# Patient Record
Sex: FEMALE | Race: WHITE | NOT HISPANIC OR LATINO | URBAN - METROPOLITAN AREA
[De-identification: names, ages, dates, MRNs, and addresses within clinical notes are randomized per-mention and may not be internally consistent; named-entity substitution may affect disease eponyms.]

---

## 2019-12-23 ENCOUNTER — OFFICE VISIT - HEALTHEAST (OUTPATIENT)
Dept: FAMILY MEDICINE | Facility: CLINIC | Age: 24
End: 2019-12-23

## 2019-12-23 DIAGNOSIS — J06.9 VIRAL URI WITH COUGH: ICD-10-CM

## 2019-12-23 RX ORDER — LORAZEPAM 0.5 MG/1
0.5 TABLET ORAL
Status: SHIPPED | COMMUNITY
Start: 2016-08-29

## 2021-06-04 VITALS
HEART RATE: 85 BPM | OXYGEN SATURATION: 99 % | WEIGHT: 179 LBS | TEMPERATURE: 99.2 F | RESPIRATION RATE: 16 BRPM | SYSTOLIC BLOOD PRESSURE: 127 MMHG | DIASTOLIC BLOOD PRESSURE: 77 MMHG

## 2021-06-04 NOTE — PATIENT INSTRUCTIONS - HE
1) Increase fluids and rest  2) Try Tessalon Perles for daytime cough relief, and guaifenesin with codeine cough syrup at night for cough relief as needed.  3) Continue taking Tylenol/Ibuprofen for pain relief as needed.  4) Salt water gargles and lozenges can be helpful for throat relief  5) recommend following up with your primary care provider if you are not feeling any better within the next 5 days.  Follow-up sooner if you develop any new or worsening symptoms such as fever or shortness of breath/wheezing.

## 2021-06-04 NOTE — PROGRESS NOTES
Chief Complaint   Patient presents with     Cough     1 week       HPI:  Christie Jack is a 24 y.o. female who presents today complaining of cough for the past 1 week.  She denies any fever, facial swelling, postnasal drainage, runny nose, sinus pain or pressure, shortness of breath, wheezing, chest pain, GI upset.  Her cough has been causing her to have a headache and sore throat.  She has had URIs similar to this in the past and the only thing that is helped is time.    History obtained from the patient.    Problem List:  There are no relevant problems documented for this patient.      No past medical history on file.    Social History     Tobacco Use     Smoking status: Never Smoker     Smokeless tobacco: Never Used   Substance Use Topics     Alcohol use: Not on file       Review of Systems   Constitutional: Negative for fever.   HENT: Positive for congestion and sore throat. Negative for facial swelling, postnasal drip, rhinorrhea, sinus pressure and sinus pain.    Respiratory: Positive for cough. Negative for shortness of breath and wheezing.    Cardiovascular: Negative for chest pain.   Gastrointestinal: Negative.    Neurological: Positive for headaches.   All other systems reviewed and are negative.      Vitals:    12/23/19 1240   BP: 127/77   Patient Site: Right Arm   Patient Position: Sitting   Cuff Size: Adult Regular   Pulse: 85   Resp: 16   Temp: 99.2  F (37.3  C)   TempSrc: Oral   SpO2: 99%   Weight: 179 lb (81.2 kg)       Physical Exam  Vitals signs and nursing note reviewed.   Constitutional:       General: She is not in acute distress.     Appearance: She is well-developed. She is not diaphoretic.   HENT:      Head: Normocephalic and atraumatic.      Right Ear: Tympanic membrane, ear canal and external ear normal.      Left Ear: Tympanic membrane, ear canal and external ear normal.      Nose: No congestion.      Mouth/Throat:      Pharynx: Posterior oropharyngeal erythema present. No oropharyngeal  exudate.   Eyes:      General:         Right eye: No discharge.         Left eye: No discharge.      Conjunctiva/sclera: Conjunctivae normal.   Cardiovascular:      Rate and Rhythm: Normal rate and regular rhythm.      Heart sounds: Normal heart sounds. No murmur.   Pulmonary:      Effort: Pulmonary effort is normal. No respiratory distress.      Breath sounds: Normal breath sounds. No wheezing, rhonchi or rales.   Lymphadenopathy:      Cervical: No cervical adenopathy.   Neurological:      Mental Status: She is alert.   Psychiatric:         Behavior: Behavior normal.         Thought Content: Thought content normal.         Judgment: Judgment normal.         Clinical Decision Making:  Physical exam is not consistent with strep or influenza.  Patient is not considered high risk.  Lung exam is clear to auscultation, low suspicion for pneumonia.  Suspect viral URI.  Recommend supportive cares, see patient instructions for details.  Patient was prescribed benzonatate and guaifenesin codeine.  At the end of the encounter, I discussed results, diagnosis, medications. Discussed red flags for immediate return to clinic/ER, as well as indications for follow up if no improvement. Patient understood and agreed to plan. Patient was stable for discharge.    1. Viral URI with cough  benzonatate (TESSALON) 200 MG capsule    codeine-guaiFENesin (GUAIFENESIN AC)  mg/5 mL liquid         Patient Instructions   1) Increase fluids and rest  2) Try Tessalon Perles for daytime cough relief, and guaifenesin with codeine cough syrup at night for cough relief as needed.  3) Continue taking Tylenol/Ibuprofen for pain relief as needed.  4) Salt water gargles and lozenges can be helpful for throat relief  5) recommend following up with your primary care provider if you are not feeling any better within the next 5 days.  Follow-up sooner if you develop any new or worsening symptoms such as fever or shortness of breath/wheezing.